# Patient Record
Sex: FEMALE | Race: WHITE | NOT HISPANIC OR LATINO | Employment: FULL TIME | URBAN - METROPOLITAN AREA
[De-identification: names, ages, dates, MRNs, and addresses within clinical notes are randomized per-mention and may not be internally consistent; named-entity substitution may affect disease eponyms.]

---

## 2017-02-24 ENCOUNTER — TELEPHONE (OUTPATIENT)
Dept: INTERNAL MEDICINE | Facility: CLINIC | Age: 42
End: 2017-02-24

## 2017-02-24 ENCOUNTER — OFFICE VISIT (OUTPATIENT)
Dept: INTERNAL MEDICINE | Facility: CLINIC | Age: 42
End: 2017-02-24

## 2017-02-24 VITALS
WEIGHT: 160 LBS | SYSTOLIC BLOOD PRESSURE: 120 MMHG | BODY MASS INDEX: 26.22 KG/M2 | DIASTOLIC BLOOD PRESSURE: 80 MMHG | TEMPERATURE: 98 F | HEART RATE: 88 BPM | RESPIRATION RATE: 20 BRPM

## 2017-02-24 DIAGNOSIS — J20.9 ACUTE BRONCHITIS, UNSPECIFIED ORGANISM: ICD-10-CM

## 2017-02-24 DIAGNOSIS — J02.9 ACUTE PHARYNGITIS, UNSPECIFIED ETIOLOGY: ICD-10-CM

## 2017-02-24 DIAGNOSIS — R05.9 COUGH: Primary | ICD-10-CM

## 2017-02-24 LAB
EXPIRATION DATE: NORMAL
EXPIRATION DATE: NORMAL
FLUAV AG NPH QL: NEGATIVE
FLUBV AG NPH QL: NEGATIVE
INTERNAL CONTROL: NORMAL
INTERNAL CONTROL: NORMAL
Lab: NORMAL
Lab: NORMAL
S PYO AG THROAT QL: NEGATIVE

## 2017-02-24 PROCEDURE — 87804 INFLUENZA ASSAY W/OPTIC: CPT | Performed by: INTERNAL MEDICINE

## 2017-02-24 PROCEDURE — 99214 OFFICE O/P EST MOD 30 MIN: CPT | Performed by: INTERNAL MEDICINE

## 2017-02-24 PROCEDURE — 87880 STREP A ASSAY W/OPTIC: CPT | Performed by: INTERNAL MEDICINE

## 2017-02-24 RX ORDER — AZITHROMYCIN 250 MG/1
TABLET, FILM COATED ORAL
Qty: 6 TABLET | Refills: 0 | Status: SHIPPED | OUTPATIENT
Start: 2017-02-24 | End: 2019-11-27

## 2017-02-24 RX ORDER — FLUCONAZOLE 150 MG/1
150 TABLET ORAL ONCE
Qty: 2 TABLET | Refills: 0 | Status: SHIPPED | OUTPATIENT
Start: 2017-02-24 | End: 2017-08-21 | Stop reason: SDUPTHER

## 2017-02-24 NOTE — PROGRESS NOTES
Scar Gonzalez is a 41 y.o. female.     Chief Complaint   Patient presents with   • Cough     exposed to flu         Cough   This is a new problem. Episode onset: 1 week ago. The problem has been gradually improving. Cough characteristics: wet, was productive of yellow sputum. Associated symptoms include a fever (up to 100, resolved), nasal congestion (resolved), rhinorrhea (clear, resolved), a sore throat and wheezing. Pertinent negatives include no chest pain, chills, ear pain, eye redness, headaches, hemoptysis, myalgias, postnasal drip, rash or shortness of breath. Treatments tried: Mucinex, Claritin, Sudafed, and Flonase. Her past medical history is significant for environmental allergies. There is no history of asthma.      She has been exposed to Strep and Influenza.    The following portions of the patient's history were reviewed and updated as appropriate: allergies, current medications, past family history, past medical history, past social history, past surgical history and problem list.      Review of Systems   Constitutional: Positive for fatigue (resolved) and fever (up to 100, resolved). Negative for appetite change and chills.   HENT: Positive for congestion, rhinorrhea (clear, resolved), sore throat and voice change (hoarse). Negative for ear pain, postnasal drip and sinus pressure.    Eyes: Negative for pain, discharge, redness and itching.   Respiratory: Positive for cough and wheezing. Negative for hemoptysis and shortness of breath.    Cardiovascular: Negative for chest pain.   Gastrointestinal: Positive for nausea (initial). Negative for abdominal pain, diarrhea and vomiting.   Musculoskeletal: Negative for arthralgias, joint swelling and myalgias.   Skin: Negative for rash.   Allergic/Immunologic: Positive for environmental allergies.   Neurological: Negative for headaches.   Hematological: Negative for adenopathy.         Blood pressure 120/80, pulse 88, temperature 98 °F (36.7  °C), temperature source Temporal Artery , resp. rate 20, weight 160 lb (72.6 kg).      Objective     Physical Exam   Constitutional: She appears well-developed and well-nourished.   HENT:   Head: Normocephalic and atraumatic.   Right Ear: Tympanic membrane, external ear and ear canal normal.   Left Ear: Tympanic membrane, external ear and ear canal normal.   Mouth/Throat: Mucous membranes are normal. No oral lesions. Posterior oropharyngeal erythema present. No oropharyngeal exudate. Tonsils are 1+ on the right. Tonsils are 1+ on the left. No tonsillar exudate (erythema +).   Tonsils normal.  No sinus tenderness to palpation.   Eyes: Conjunctivae are normal.   Neck: Normal range of motion. Neck supple.   Cardiovascular: Normal rate and regular rhythm.    No murmur heard.  Pulmonary/Chest: Effort normal and breath sounds normal.   Abdominal: Soft. Bowel sounds are normal. She exhibits no distension and no mass. There is no hepatosplenomegaly. There is no tenderness.   Lymphadenopathy:     She has no cervical adenopathy.   Skin: No rash noted.   Psychiatric: She has a normal mood and affect.   Nursing note and vitals reviewed.      Results for orders placed or performed in visit on 02/24/17   POC Influenza A / B   Result Value Ref Range    Rapid Influenza A Ag NEGATIVE     Rapid Influenza B Ag NEGATIVE     Internal Control Passed Passed    Lot Number [id number]     Expiration Date 10-18    POC Rapid Strep A   Result Value Ref Range    Rapid Strep A Screen Negative Negative, VALID, INVALID, Not Performed    Internal Control Passed Passed    Lot Number [id number]     Expiration Date 7-18          Assessment/Plan   Katja was seen today for cough.    Diagnoses and all orders for this visit:    Cough  -     POC Influenza A / B    Acute pharyngitis, unspecified etiology  -     POC Rapid Strep A    Acute bronchitis, unspecified organism  -     azithromycin (ZITHROMAX Z-FELIX) 250 MG tablet; Take 2 tablets the first day, then 1  tablet daily for 4 days.         Continue current medications and plenty of fluids.    Return if symptoms worsen or fail to improve.

## 2017-02-24 NOTE — TELEPHONE ENCOUNTER
----- Message from Latonia Navarro sent at 2/24/2017  8:44 AM EST -----  Contact: SELF  WOULD LIKE TO BE SEEN TODAY DUE TO PERSISTENT COUGH AND WHEEZING.  CAN SHE BE FIT IN?    CALL BACK NUMBER [phone]

## 2017-02-24 NOTE — TELEPHONE ENCOUNTER
----- Message from Magdalena Weaver sent at 2/24/2017  2:45 PM EST -----  [phone]    PT WAS JUST SEEN AND GIVEN ANTIBIOTICS.  FORGOT TO ASK FOR DIFLUCAN.  PLEASE CALL IN    KATHRYN LOPEZ

## 2017-08-21 RX ORDER — FLUCONAZOLE 150 MG/1
TABLET ORAL
Qty: 2 TABLET | Refills: 0 | Status: SHIPPED | OUTPATIENT
Start: 2017-08-21 | End: 2019-11-27

## 2018-02-26 ENCOUNTER — TELEPHONE (OUTPATIENT)
Dept: INTERNAL MEDICINE | Facility: CLINIC | Age: 43
End: 2018-02-26

## 2019-11-27 ENCOUNTER — OFFICE VISIT (OUTPATIENT)
Dept: INTERNAL MEDICINE | Facility: CLINIC | Age: 44
End: 2019-11-27

## 2019-11-27 VITALS
TEMPERATURE: 98.2 F | BODY MASS INDEX: 27.86 KG/M2 | RESPIRATION RATE: 16 BRPM | DIASTOLIC BLOOD PRESSURE: 80 MMHG | SYSTOLIC BLOOD PRESSURE: 110 MMHG | HEART RATE: 80 BPM | WEIGHT: 170 LBS

## 2019-11-27 DIAGNOSIS — F41.1 GENERALIZED ANXIETY DISORDER: ICD-10-CM

## 2019-11-27 DIAGNOSIS — F43.21 SITUATIONAL DEPRESSION: Primary | ICD-10-CM

## 2019-11-27 PROCEDURE — 99213 OFFICE O/P EST LOW 20 MIN: CPT | Performed by: INTERNAL MEDICINE

## 2019-11-27 RX ORDER — CLONIDINE HYDROCHLORIDE 0.1 MG/1
0.1 TABLET ORAL DAILY
COMMUNITY

## 2019-11-27 RX ORDER — ESCITALOPRAM OXALATE 10 MG/1
10 TABLET ORAL DAILY
Qty: 30 TABLET | Refills: 5 | Status: SHIPPED | OUTPATIENT
Start: 2019-11-27 | End: 2021-06-29

## 2019-11-27 RX ORDER — DOXYCYCLINE HYCLATE 150 MG/1
150 TABLET ORAL DAILY
COMMUNITY
End: 2021-06-29

## 2019-11-27 NOTE — PROGRESS NOTES
Subjective       Katja Gonzalez is a 43 y.o. female.     Chief Complaint   Patient presents with   • Depression     anxiety or depression medication       History obtained from the patient.      History of Present Illness   The patient was last seen on 2/24/2017.    Depression and Anxiety Disorder Follow-Up: The patient is here for follow-up of Recurrent Situational Depression and Anxiety,  which has worsened.    Interval Events: The patient filed for divorce in March 2019, and she is having a hard time coping.  She has been seeing a therapist for a year, and it was suggested she schedule an appointment to discuss medication.  She states she was on Zoloft for Situational Depression as a teenager and recalls it worked well without side effects.  She also was on Wellbutrin several years ago, which worked well, but caused insomnia.    Symptoms: Stable anxiety for several years.  New onset situational depression for the past year.  Complains of insomnia (able to get to sleep, but wakes early and cannot get back to sleep) apathy, memory issues, and loss of concentration.  Denies recent panic attacks, anhedonia, feelings of hopelessness, feelings of worthlessness, and feelings of guilt.    Associated Symptoms: Denies suicidal ideation.    Medication: None.      The following portions of the patient's history were reviewed and updated as appropriate: allergies, current medications, past family history, past medical history, past social history, past surgical history and problem list.      Review of Systems   Constitutional: Positive for unexpected weight change (10 pound weight gain since December 2017). Negative for fatigue.   Neurological:        Complains of memory issues   Psychiatric/Behavioral: Positive for decreased concentration and sleep disturbance. Negative for self-injury and suicidal ideas. The patient is nervous/anxious.            Objective     Blood pressure 110/80, pulse 80, temperature 98.2 °F (36.8  °C), temperature source Temporal, resp. rate 16, weight 77.1 kg (170 lb).    Physical Exam   Constitutional:   Overweight.   Neurological: She is alert.   Psychiatric: She has a normal mood and affect.   Nursing note and vitals reviewed.    Counseling was given to patient for the following topics: appropriate exercise, risks and benefits of treament options (adding medication but continuing counseling), side effects of medications (Lexapro), stress increase in the patient's life (as per HPI), symptoms of anxiety, and symptoms of depression . Total time of the encounter was 15 minutes and 12 minutes was spent counseling.        Assessment/Plan   Katja was seen today for depression.    Diagnoses and all orders for this visit:    Situational depression  -     escitalopram (LEXAPRO) 10 MG tablet; Take 1 tablet by mouth Daily.    Generalized anxiety disorder  -     escitalopram (LEXAPRO) 10 MG tablet; Take 1 tablet by mouth Daily.            Return in about 3 years (around 11/27/2022) for Annual physical, fasting.

## 2021-06-29 ENCOUNTER — OFFICE VISIT (OUTPATIENT)
Dept: OBSTETRICS AND GYNECOLOGY | Facility: CLINIC | Age: 46
End: 2021-06-29

## 2021-06-29 VITALS
SYSTOLIC BLOOD PRESSURE: 114 MMHG | DIASTOLIC BLOOD PRESSURE: 80 MMHG | WEIGHT: 176.8 LBS | HEIGHT: 64 IN | BODY MASS INDEX: 30.19 KG/M2

## 2021-06-29 DIAGNOSIS — Z01.419 ENCOUNTER FOR ANNUAL ROUTINE GYNECOLOGICAL EXAMINATION: Primary | ICD-10-CM

## 2021-06-29 DIAGNOSIS — K62.5 RECTAL BLEEDING: ICD-10-CM

## 2021-06-29 DIAGNOSIS — Z12.31 ENCOUNTER FOR SCREENING MAMMOGRAM FOR MALIGNANT NEOPLASM OF BREAST: ICD-10-CM

## 2021-06-29 PROCEDURE — 99386 PREV VISIT NEW AGE 40-64: CPT | Performed by: NURSE PRACTITIONER

## 2021-06-29 RX ORDER — DOXYCYCLINE HYCLATE 150 MG/1
TABLET, DELAYED RELEASE ORAL
COMMUNITY
Start: 2021-06-11

## 2021-08-27 ENCOUNTER — APPOINTMENT (OUTPATIENT)
Dept: MAMMOGRAPHY | Facility: HOSPITAL | Age: 46
End: 2021-08-27

## 2022-01-20 ENCOUNTER — APPOINTMENT (OUTPATIENT)
Dept: MAMMOGRAPHY | Facility: HOSPITAL | Age: 47
End: 2022-01-20

## 2022-03-21 ENCOUNTER — TELEPHONE (OUTPATIENT)
Dept: OBSTETRICS AND GYNECOLOGY | Facility: CLINIC | Age: 47
End: 2022-03-21

## 2022-03-21 NOTE — TELEPHONE ENCOUNTER
"S/w pt. She states she was advised to get the HPV vaccines before she turned 47 in 12/9/22 and states [facility] told her that her insurance will not cover vaccine due to \"it not being a need for vaccine\" I told patient that per Kay last OV note they discussed vaccine benefits in receiving it and that she can come here to get vaccine and does not need an appt for this. She v/u.   "

## 2022-07-20 ENCOUNTER — OFFICE VISIT (OUTPATIENT)
Dept: OBSTETRICS AND GYNECOLOGY | Facility: CLINIC | Age: 47
End: 2022-07-20

## 2022-07-20 VITALS
HEIGHT: 64 IN | BODY MASS INDEX: 31.58 KG/M2 | WEIGHT: 185 LBS | DIASTOLIC BLOOD PRESSURE: 62 MMHG | SYSTOLIC BLOOD PRESSURE: 128 MMHG

## 2022-07-20 DIAGNOSIS — Z12.11 SCREENING FOR COLORECTAL CANCER: ICD-10-CM

## 2022-07-20 DIAGNOSIS — Z12.12 SCREENING FOR COLORECTAL CANCER: ICD-10-CM

## 2022-07-20 DIAGNOSIS — N92.6 IRREGULAR MENSES: ICD-10-CM

## 2022-07-20 DIAGNOSIS — N94.6 DYSMENORRHEA: ICD-10-CM

## 2022-07-20 DIAGNOSIS — Z12.31 ENCOUNTER FOR SCREENING MAMMOGRAM FOR MALIGNANT NEOPLASM OF BREAST: ICD-10-CM

## 2022-07-20 DIAGNOSIS — B97.7 HPV (HUMAN PAPILLOMA VIRUS) INFECTION: ICD-10-CM

## 2022-07-20 DIAGNOSIS — Z23 NEED FOR HPV VACCINE: ICD-10-CM

## 2022-07-20 DIAGNOSIS — Z01.419 PAP TEST, AS PART OF ROUTINE GYNECOLOGICAL EXAMINATION: Primary | ICD-10-CM

## 2022-07-20 PROCEDURE — 90651 9VHPV VACCINE 2/3 DOSE IM: CPT | Performed by: NURSE PRACTITIONER

## 2022-07-20 PROCEDURE — 99396 PREV VISIT EST AGE 40-64: CPT | Performed by: NURSE PRACTITIONER

## 2022-07-20 PROCEDURE — 90471 IMMUNIZATION ADMIN: CPT | Performed by: NURSE PRACTITIONER

## 2022-07-20 NOTE — PROGRESS NOTES
GYN Annual Exam     CC - Here for annual exam.        HPI  Katja Gonzalez is a 46 y.o. female, , who presents for annual well woman exam. Patient's last menstrual period was 2022..  Periods are rare, lasting 4 days. .  Dysmenorrhea:moderate, occurring first 1-2 days of flow.  Patient reports problems with: heavy bleeding. There were no changes to her medical or surgical history since her last visit.. Partner Status: Marital Status: .  New Partners since last visit: no.  Desires STD Screening: no. STD testing recommendations have been explained to the patient and she does not desire STD testing.    Patient stated that she has not had a menstrual cycle since the end of March. Patient stated that she started Metformin on 2022 and 10 days later she started bleeding. Patient stated that the bleeding was light for 1 day and was extremely heavy on day 2 and 3. Patient stated that she experienced cramping, back pain, nausea, and headaches during this time. Patient stated that with the bleeding she had large blood clots. Patient stated that she has also had unexpected weight gain. Patient denies any other issues or concerns at today's office visit.       Additional OB/GYN History   Current contraception: contraceptive methods: None  Desires to: do not start contraception  Last Pap : 2021. Result: Normal- HPV 16 negative- HPV 18 negative- HPV NON 16/18 positive  Last Completed Pap Smear          Ordered - PAP SMEAR (Every 3 Years) Ordered on 2021  SCANNED - PAP SMEAR    10/17/2016  Done - neg              History of abnormal Pap smear: yes - History of Abnormal Pap  Family history of uterine, colon, breast, or ovarian cancer: yes - Family history of Colon cancer  Performs monthly Self-Breast Exam: no  Last mammogram: Never    Last Completed Mammogram     This patient has no relevant Health Maintenance data.         Exercises Regularly: no  Feelings of Anxiety or Depression:  "no  Tobacco Usage?: No   OB History        2    Para   2    Term                AB        Living   2       SAB        IAB        Ectopic        Molar        Multiple        Live Births   2                Health Maintenance   Topic Date Due   • MAMMOGRAM  Never done   • ANNUAL PHYSICAL  Never done   • HEPATITIS C SCREENING  Never done   • COVID-19 Vaccine (3 - Booster for Moderna series) 2021   • INFLUENZA VACCINE  10/01/2022   • Annual Gynecologic Pelvic and Breast Exam  2023   • PAP SMEAR  2024   • TDAP/TD VACCINES (2 - Td or Tdap) 10/23/2024   • Pneumococcal Vaccine 0-64  Aged Out       The additional following portions of the patient's history were reviewed and updated as appropriate: allergies, current medications, past family history, past medical history, past social history and past surgical history.    Review of Systems   Constitutional: Positive for unexpected weight gain.   Respiratory: Negative.    Cardiovascular: Negative.    Gastrointestinal: Negative.    Genitourinary: Positive for menstrual problem.   Skin: Negative.    Neurological: Negative.          I have reviewed and agree with the HPI, ROS, and historical information as entered above. Clare Trinidad, APRN    Objective   /62   Ht 162.6 cm (64\")   Wt 83.9 kg (185 lb)   LMP 2022   Breastfeeding No   BMI 31.76 kg/m²     Physical Exam  Vitals and nursing note reviewed. Exam conducted with a chaperone present.   Constitutional:       Appearance: She is well-developed.   HENT:      Head: Normocephalic and atraumatic.   Neck:      Thyroid: No thyroid mass or thyromegaly.   Cardiovascular:      Rate and Rhythm: Normal rate and regular rhythm.      Heart sounds: No murmur heard.  Pulmonary:      Effort: Pulmonary effort is normal. No retractions.      Breath sounds: Normal breath sounds. No wheezing, rhonchi or rales.   Chest:      Chest wall: No mass or tenderness.   Breasts:      Right: Normal. No " mass, nipple discharge, skin change or tenderness.      Left: Normal. No mass, nipple discharge, skin change or tenderness.       Abdominal:      Palpations: Abdomen is soft. Abdomen is not rigid. There is no mass.      Tenderness: There is no abdominal tenderness. There is no guarding.      Hernia: No hernia is present. There is no hernia in the left inguinal area.   Genitourinary:     Labia:         Right: No rash, tenderness or lesion.         Left: No rash, tenderness or lesion.       Vagina: Normal. No vaginal discharge or lesions.      Cervix: No cervical motion tenderness, discharge, lesion or cervical bleeding.      Uterus: Normal. Not enlarged, not fixed and not tender.       Adnexa:         Right: No mass or tenderness.          Left: No mass or tenderness.        Rectum: No external hemorrhoid.   Musculoskeletal:      Cervical back: Normal range of motion. No muscular tenderness.   Neurological:      Mental Status: She is alert and oriented to person, place, and time.   Psychiatric:         Behavior: Behavior normal.            Assessment and Plan    Problem List Items Addressed This Visit        Genitourinary and Reproductive     HPV (human papilloma virus) infection    Overview     Non 16/18. Gardasil series started 7/20/22             Other Visit Diagnoses     Pap test, as part of routine gynecological examination    -  Primary    Relevant Orders    LIQUID-BASED PAP SMEAR, [facility] ([facility])    Irregular menses        Relevant Orders    TSH Rfx On Abnormal To Free T4    Follicle Stimulating Hormone    Estradiol    US Non-ob Transvaginal    Need for HPV vaccine        Relevant Medications    HPV 9-Valent Recomb Vaccine suspension 0.5 mL (Completed)    Encounter for screening mammogram for malignant neoplasm of breast        Relevant Orders    Mammo Screening Digital Tomosynthesis Bilateral With CAD    Screening for colorectal cancer        Relevant Orders    Cologuard - Stool, Per Rectum     Dysmenorrhea        Relevant Orders    US Non-ob Transvaginal        Discussed menstrual irregularity likely related to PCOS but could be perimenopause. Discussed importance of having menses at least every 3 months if PCOS related.       1. GYN annual well woman exam.   2. Reviewed monthly self breast exams.  Instructed to call with lumps, pain, or breast discharge.    3. Ordered Mammogram today  4. Return in about 4 weeks (around 8/17/2022) for U/S .   5. Discussed options for menstrual issues including, ibuprofen, lysteda, contraception. Not very interested in contraception at this time but accepted info on mirena to review. Will continue ibuprofen for now.   6. Track cycles and call if no menses > 3 months. The fact that she responded with a cycle to metformin makes it more likely PCOS related but labs done today.   7. If HPV still positive will need colpo. Gardasil # 1 today.   8. Agrees to cologuard at 47    Clare Trinidad, ROSSI  07/20/2022

## 2022-07-21 LAB
ESTRADIOL SERPL-MCNC: 155 PG/ML
FSH SERPL-ACNC: 8 MIU/ML
TSH SERPL DL<=0.005 MIU/L-ACNC: 1.05 UIU/ML (ref 0.27–4.2)

## 2022-07-22 LAB — REF LAB TEST METHOD: NORMAL

## 2022-07-25 ENCOUNTER — TELEPHONE (OUTPATIENT)
Dept: OBSTETRICS AND GYNECOLOGY | Facility: CLINIC | Age: 47
End: 2022-07-25

## 2022-07-25 NOTE — TELEPHONE ENCOUNTER
S/w pt in regards to pap smear results, she v/u.     Pt. Also wanted to know if her metformin dosage is going to change and what the recommended dose is for PCOS.     I advised patient that when she comes in for her next appt to discuss with Tom about medication dosage after U/S results reviewed. She v/u

## 2022-07-25 NOTE — TELEPHONE ENCOUNTER
Patient was here for an annual recently and had gotten her pap smear results back.    She states because it is considered normal, she wants to know if she needs to have a Colpo.    She also has questions regarding if she should increase her Metformin dosage.    Please advise.

## 2022-08-12 ENCOUNTER — TELEPHONE (OUTPATIENT)
Dept: OBSTETRICS AND GYNECOLOGY | Facility: CLINIC | Age: 47
End: 2022-08-12

## 2022-08-12 RX ORDER — NAPROXEN SODIUM 550 MG/1
550 TABLET ORAL 2 TIMES DAILY WITH MEALS
Qty: 30 TABLET | Refills: 0 | Status: SHIPPED | OUTPATIENT
Start: 2022-08-12

## 2022-08-12 NOTE — TELEPHONE ENCOUNTER
Pt. Is requesting anaprox for back pain with her periods. States RWO had previously given them to her. She is back on metformin for her periods and having them monthly now so she is having more pain. Informed TH is not here today. Pt. Is currently on her period, O. Kris states he is ok with calling in the anaprox for the patient.

## 2022-08-12 NOTE — TELEPHONE ENCOUNTER
PT ASKED IF SHE COULD START THE MEDS OUMAR HAD REFERENCED AT LAST APPOINTMENT TO HELP WITH PAINFUL PERIODS     PHARMACY IS [facility] IN [place]

## 2022-09-21 ENCOUNTER — OFFICE VISIT (OUTPATIENT)
Dept: OBSTETRICS AND GYNECOLOGY | Facility: CLINIC | Age: 47
End: 2022-09-21

## 2022-09-21 VITALS
SYSTOLIC BLOOD PRESSURE: 110 MMHG | BODY MASS INDEX: 32.37 KG/M2 | DIASTOLIC BLOOD PRESSURE: 70 MMHG | HEIGHT: 64 IN | WEIGHT: 189.6 LBS

## 2022-09-21 DIAGNOSIS — N94.6 DYSMENORRHEA: ICD-10-CM

## 2022-09-21 DIAGNOSIS — D25.9 UTERINE LEIOMYOMA, UNSPECIFIED LOCATION: ICD-10-CM

## 2022-09-21 DIAGNOSIS — Z23 NEED FOR HPV VACCINE: Primary | ICD-10-CM

## 2022-09-21 PROCEDURE — 90471 IMMUNIZATION ADMIN: CPT | Performed by: NURSE PRACTITIONER

## 2022-09-21 PROCEDURE — 90651 9VHPV VACCINE 2/3 DOSE IM: CPT | Performed by: NURSE PRACTITIONER

## 2022-09-21 PROCEDURE — 99213 OFFICE O/P EST LOW 20 MIN: CPT | Performed by: NURSE PRACTITIONER

## 2022-09-21 RX ORDER — ORAL SEMAGLUTIDE 3 MG/1
TABLET ORAL
COMMUNITY
Start: 2022-09-19

## 2022-09-21 NOTE — PROGRESS NOTES
Chief Complaint   Patient presents with   • Follow-up     Dysmennorhea           Subjective   HPI  Katja Gonzalez is a 46 y.o. female, , Patient's last menstrual period was 2022 (exact date).. She presents for follow up evaluation of Dysmenorrhea. She reports the abdominal cramps and back pain as severe  It is improved with Tylenol/NSAIDS and Naproxen. When she was taking Metformin, her periods were regular every 28 days. Once she tried to taper off Metformin, periods been irregular 33 days. She stopped taking Metformin 22 and started the Rybelsus 22 .    US was done today. Endometrial thickness 6.6mm. Fibroid 7.4mmx10.5mmx8.6mm possible fibroid ill-defined    Patient is due for 2nd HPV vaccine today.   HPV Injection Note  Katja Gonzalez is a 46 y.o. female here for counseling on HPV vaccination. Her LMP was 22.     She has received and reviewed the Gardasil Information Sheet. Contraindications have been reviewed.    Information Reviewed  ROSSI Conteh reviewed the possible side effects of pain, swelling, itching at the injection site, fever, difficulty breathing and anaphylactic reactions.   Injection Details  Noted in the Immunization Activity.    Future Injections  Her next injection is in 3-4 months.     Toleration  Patient tolerated the injection well with no problems.    Electronically Signed By:  ROSSI Conteh  2022             Thromboembolic Disease: none  History of hypertension: no  History of migraines: yes with aura  Tobacco Usage?: No     Additional OB/GYN History   Last Pap :   Last Completed Pap Smear          PAP SMEAR (Every 3 Years) Next due on 2022  LIQUID-BASED PAP SMEAR, [facility] ([facility])    2021  SCANNED - PAP SMEAR    10/17/2016  Done - neg                  Current Outpatient Medications:   •  cloNIDine (CATAPRES) 0.1 MG tablet, Take 0.1 mg by mouth Daily., Disp: , Rfl:   •  doxycycline (DORYX) 150 MG  "enteric coated tablet, , Disp: , Rfl:   •  loratadine (CLARITIN) 10 MG tablet, Take  by mouth daily., Disp: , Rfl:   •  naproxen sodium (Anaprox DS) 550 MG tablet, Take 1 tablet by mouth 2 (Two) Times a Day With Meals., Disp: 30 tablet, Rfl: 0  •  Rybelsus 3 MG tablet, TAKE 1 TABLET BY MOUTH EVERY DAY FOR 30 DAYS, Disp: , Rfl:   No current facility-administered medications for this visit.     Past Medical History:   Diagnosis Date   • Abnormal Pap smear of cervix    • Allergies    • Chronic kidney disease 2011   • Fibroid 2012   • History of pneumonia 02/2016    Dx 2/16- right perihilar and RLL   • History of scoliosis    • History of varicella    • HPV (human papilloma virus) infection 2021   • IBS (irritable bowel syndrome)    • Kidney stone 2011   • Migraine with aura    • Ovarian cyst 2011   • PMS (premenstrual syndrome) 1995   • Polycystic ovary syndrome 2011        Past Surgical History:   Procedure Laterality Date   • TUBAL ABDOMINAL LIGATION  10/2009   • TYMPANOSTOMY TUBE PLACEMENT Bilateral 1985         The additional following portions of the patient's history were reviewed and updated as appropriate: allergies, current medications, past family history, past medical history, past social history, past surgical history and problem list.    Review of Systems   Constitutional: Negative.    Respiratory: Negative.    Cardiovascular: Negative.    Gastrointestinal: Negative.    Genitourinary: Positive for menstrual problem.       I have reviewed and agree with the HPI, ROS, and historical information as entered above. Clare Trinidad, APRN    Objective   /70   Ht 162.6 cm (64\")   Wt 86 kg (189 lb 9.6 oz)   LMP 09/14/2022 (Exact Date)   BMI 32.54 kg/m²     Physical Exam  Constitutional:       Appearance: Normal appearance.   Pulmonary:      Effort: Pulmonary effort is normal.   Neurological:      Mental Status: She is alert.         Assessment & Plan     Assessment     Problem List Items Addressed This " Visit        Genitourinary and Reproductive     Uterine fibroid    Overview     7 x 10 x 6 mm- she reports she was diagnosed with this in 2011.           Other Visit Diagnoses     Need for HPV vaccine    -  Primary    Relevant Medications    HPV 9-Valent Recomb Vaccine suspension 0.5 mL (Completed)    Dysmenorrhea            U/S reveals endometrium 6.6mm, small uterine fibroid noted. Ovaries appear normal.     Plan     Follow Up: Return if symptoms worsen or fail to improve, for Annual physical.  2.  Patient reports significant back pain with menses, some relief with naproxen. She wants to watch cycles for a little while longer before intervening. We dicussed slynd and mirena as potential treatment options, not a candidate for RONALD due to migraine with aura. Sample pack of slynd given and she will call if desired.   3. Gardasil #2 given today.  4. Plans cologuard at age 47, it has been ordered.        Clare Trinidad, APRN  09/21/2022

## 2022-10-24 ENCOUNTER — HOSPITAL ENCOUNTER (OUTPATIENT)
Dept: MAMMOGRAPHY | Facility: HOSPITAL | Age: 47
Discharge: HOME | End: 2022-10-24
Admitting: NURSE PRACTITIONER

## 2022-10-24 DIAGNOSIS — Z12.31 ENCOUNTER FOR SCREENING MAMMOGRAM FOR MALIGNANT NEOPLASM OF BREAST: ICD-10-CM

## 2022-10-24 PROCEDURE — 77067 SCR MAMMO BI INCL CAD: CPT

## 2022-10-24 PROCEDURE — 77063 BREAST TOMOSYNTHESIS BI: CPT

## 2022-10-24 PROCEDURE — 77063 BREAST TOMOSYNTHESIS BI: CPT | Performed by: RADIOLOGY

## 2022-10-24 PROCEDURE — 77067 SCR MAMMO BI INCL CAD: CPT | Performed by: RADIOLOGY

## 2024-02-06 ENCOUNTER — TRANSCRIBE ORDERS (OUTPATIENT)
Dept: ADMINISTRATIVE | Facility: HOSPITAL | Age: 49
End: 2024-02-06
Payer: COMMERCIAL

## 2024-02-06 DIAGNOSIS — Z12.31 VISIT FOR SCREENING MAMMOGRAM: Primary | ICD-10-CM

## 2024-04-18 ENCOUNTER — HOSPITAL ENCOUNTER (OUTPATIENT)
Dept: MAMMOGRAPHY | Facility: HOSPITAL | Age: 49
Discharge: HOME | End: 2024-04-18
Admitting: NURSE PRACTITIONER
Payer: COMMERCIAL

## 2024-04-18 DIAGNOSIS — Z12.31 VISIT FOR SCREENING MAMMOGRAM: ICD-10-CM

## 2024-04-18 PROCEDURE — 77063 BREAST TOMOSYNTHESIS BI: CPT

## 2024-04-18 PROCEDURE — 77067 SCR MAMMO BI INCL CAD: CPT

## 2025-07-28 ENCOUNTER — TRANSCRIBE ORDERS (OUTPATIENT)
Dept: ADMINISTRATIVE | Facility: HOSPITAL | Age: 50
End: 2025-07-28
Payer: COMMERCIAL

## 2025-07-28 DIAGNOSIS — Z12.31 ENCOUNTER FOR SCREENING MAMMOGRAM FOR MALIGNANT NEOPLASM OF BREAST: Primary | ICD-10-CM
